# Patient Record
Sex: FEMALE | Race: WHITE | NOT HISPANIC OR LATINO | URBAN - METROPOLITAN AREA
[De-identification: names, ages, dates, MRNs, and addresses within clinical notes are randomized per-mention and may not be internally consistent; named-entity substitution may affect disease eponyms.]

---

## 2017-09-25 ENCOUNTER — OFFICE VISIT - RIVER FALLS (OUTPATIENT)
Dept: FAMILY MEDICINE | Facility: CLINIC | Age: 27
End: 2017-09-25

## 2017-09-25 ASSESSMENT — MIFFLIN-ST. JEOR: SCORE: 1832.74

## 2018-01-02 ENCOUNTER — OFFICE VISIT - RIVER FALLS (OUTPATIENT)
Dept: FAMILY MEDICINE | Facility: CLINIC | Age: 28
End: 2018-01-02

## 2018-09-04 ENCOUNTER — OFFICE VISIT - RIVER FALLS (OUTPATIENT)
Dept: FAMILY MEDICINE | Facility: CLINIC | Age: 28
End: 2018-09-04

## 2018-09-04 ASSESSMENT — MIFFLIN-ST. JEOR: SCORE: 1863.59

## 2018-09-06 ENCOUNTER — AMBULATORY - RIVER FALLS (OUTPATIENT)
Dept: FAMILY MEDICINE | Facility: CLINIC | Age: 28
End: 2018-09-06

## 2019-03-19 ENCOUNTER — OFFICE VISIT - RIVER FALLS (OUTPATIENT)
Dept: FAMILY MEDICINE | Facility: CLINIC | Age: 29
End: 2019-03-19

## 2019-03-26 ENCOUNTER — OFFICE VISIT - RIVER FALLS (OUTPATIENT)
Dept: FAMILY MEDICINE | Facility: CLINIC | Age: 29
End: 2019-03-26

## 2019-03-26 ASSESSMENT — MIFFLIN-ST. JEOR: SCORE: 1830.02

## 2019-06-19 ENCOUNTER — AMBULATORY - RIVER FALLS (OUTPATIENT)
Dept: FAMILY MEDICINE | Facility: CLINIC | Age: 29
End: 2019-06-19

## 2022-02-11 VITALS
WEIGHT: 250.6 LBS | BODY MASS INDEX: 42.78 KG/M2 | HEART RATE: 81 BPM | OXYGEN SATURATION: 98 % | SYSTOLIC BLOOD PRESSURE: 116 MMHG | HEIGHT: 64 IN | DIASTOLIC BLOOD PRESSURE: 70 MMHG

## 2022-02-11 VITALS
WEIGHT: 257.4 LBS | HEART RATE: 88 BPM | BODY MASS INDEX: 43.94 KG/M2 | SYSTOLIC BLOOD PRESSURE: 110 MMHG | DIASTOLIC BLOOD PRESSURE: 78 MMHG | HEIGHT: 64 IN | TEMPERATURE: 98.8 F

## 2022-02-11 VITALS
OXYGEN SATURATION: 99 % | WEIGHT: 250 LBS | DIASTOLIC BLOOD PRESSURE: 70 MMHG | SYSTOLIC BLOOD PRESSURE: 108 MMHG | TEMPERATURE: 97.8 F | HEIGHT: 64 IN | BODY MASS INDEX: 42.68 KG/M2 | HEART RATE: 61 BPM

## 2022-02-11 VITALS
OXYGEN SATURATION: 97 % | DIASTOLIC BLOOD PRESSURE: 78 MMHG | WEIGHT: 255.8 LBS | HEART RATE: 79 BPM | BODY MASS INDEX: 44.25 KG/M2 | SYSTOLIC BLOOD PRESSURE: 118 MMHG

## 2022-02-16 NOTE — PROGRESS NOTES
Patient:   DANICA DE JESUS            MRN: [id number]            FIN: [id number]               Age:   28 years     Sex:  Female     :  1990   Associated Diagnoses:   Counseling about travel   Author:   Quincy Wright MD      Chief Complaint   3/26/2019 4:11 PM CDT    Travel physical to [place]      History of Present Illness   see chief complaint as noted above and confirmed with the patient     28 year old female here today for a travel physical. She is going to be traveling to [place], her  was born in [place] and they are traveling there on a mission trip. She is going to be leaving in the beginning of 2019. She is going to be staying in [facility] and going to [employer]. Mom would like to know which vaccines she needs.       Review of Systems   Constitutional:  No fever.    Respiratory:  No shortness of breath.    Hematology/Lymphatics:  No bleeding tendency.    Immunologic:  No recurrent infections, No malaise.       Health Status   Allergies:    Allergic Reactions (Selected)  Severity Not Documented  Nickel (No reactions were documented)   Medications:  (Selected)      Problem list:    All Problems  Migraines / SNOMED CT [id number] / Confirmed  Morbid obesity / SNOMED CT 634062944 / Probable  Resolved: History of varicella as a child / SNOMED CT 557211176      Histories   Past Medical History:    Active  Migraines ([id number])  Resolved  History of varicella as a child (926768079):  Resolved.   Family History:    ALS - Amyotrophic lateral sclerosis  Grandmother (P) ()     Procedure history:    No active procedure history items have been selected or recorded.   Social History:        Alcohol Assessment: Current            1-2 times per month, 1 drinks/episode maximum.      Tobacco Assessment: Denies Tobacco Use            Never smoker      Substance Abuse Assessment: Denies Substance Abuse            Never       Employment and Education Assessment            .      Home and Environment Assessment            Marital status:  (Living together).  Spouse/Partner name: Ky.  Lives with Spouse.  Living               situation: Home/Independent.      Nutrition and Health Assessment            Type of diet: Regular.      Exercise and Physical Activity Assessment: Does not exercise      Sexual Assessment            Sexually active: Yes.  Sexual orientation: Heterosexual.      Other Assessment            First menses age 14.  Regular menses.  Menstrual duration 4-5 days.  Cycle interval 29 +/- days.  No history               of abnormal Pap smear.      Physical Examination   Vital Signs   3/26/2019 4:11 PM CDT Temperature Tympanic 97.8 DegF  LOW    Peripheral Pulse Rate 61 bpm    Pulse Site Radial artery    Systolic Blood Pressure 108 mmHg    Diastolic Blood Pressure 70 mmHg    Mean Arterial Pressure 83 mmHg    BP Site Right arm    Oxygen Saturation 99 %      Measurements from flowsheet : Measurements   3/26/2019 4:11 PM CDT Height Measured - Standard 63.75 in    Weight Measured - Standard 250 lb    BSA 2.26 m2    Body Mass Index 43.24 kg/m2  HI      General:  Alert and oriented, No acute distress.    Eye:  Pupils are equal, round and reactive to light, Normal conjunctiva.    HENT:  Oral mucosa is moist.    Neck:  Supple.    Respiratory:  Respirations are non-labored.    Cardiovascular:  Normal rate, Regular rhythm, No edema.    Gastrointestinal:  Non-distended.    Musculoskeletal:  Normal gait.    Integumentary:  Warm, No rash.    Psychiatric:  Cooperative, Appropriate mood & affect, Normal judgment.       Review / Management   Results review      Impression and Plan       Diagnosis     Counseling about travel (UAJ69-JU Z71.89).     Plan:  Will give Typhoid, First Hepatitis A, she will need to come in for second Hepatitis A in the beginning of June 2019. Will also give Flu vaccine.     Will send in  Cipro for travelers Diarrhea. .    I, Violetta Loco Medical Assistant acted solely as a scribe for, and in presence of Dr. Quincy Wright who performed the services.

## 2022-02-16 NOTE — PROGRESS NOTES
Patient:   DANICA DE JESUS            MRN: [id number]            FIN: [id number]               Age:   28 years     Sex:  Female     :  1990   Associated Diagnoses:   Pre-employment examination   Author:   Etta Lizarraga      Subjective   Chief complaint 2018 12:07 PM CDT    Pt here for pre-employment px/TB test -[facility]  .     see copy of h & P      Health Status   Allergies:    Allergic Reactions (Selected)  Severity Not Documented  Nickel (No reactions were documented)   Problem list:    All Problems  Migraines / SNOMED CT [id number] / Confirmed  Morbid obesity / SNOMED CT 923030708 / Probable  Resolved: History of varicella as a child / SNOMED CT 149799158      Objective   Vital Signs   2018 12:07 PM CDT Temperature Tympanic 98.8 DegF    Peripheral Pulse Rate 88 bpm    Pulse Site Radial artery    HR Method Manual    Systolic Blood Pressure 110 mmHg    Diastolic Blood Pressure 78 mmHg    Mean Arterial Pressure 89 mmHg    BP Site Right arm    BP Method Manual      Measurements from flowsheet : Measurements   2018 12:07 PM CDT Height Measured - Standard 63.75 in    Weight Measured - Standard 257.4 lb    BSA 2.29 m2    Body Mass Index 44.52 kg/m2  HI         Impression and Plan   Assessment and Plan:          Diagnosis: Pre-employment examination (EHV71-IU Z02.1).    Orders     no contraindications to employment pending [place] screen.     .

## 2022-02-16 NOTE — TELEPHONE ENCOUNTER
---------------------  From: Sherry Hess LPN (Phone Messages Pool ([id number]_[facility]))   To: Phone Messages Pool ([id number]_[place] [facility] [place]);     Sent: 3/19/2019 8:51:44 AM CDT  Subject: travel vaccine questions       Phone Message    PCP:   NEFTALY       Time of Call:  0821       Person Calling:  Patient   Phone number:  [phone] ok LM    Returned call at: [phone]    Note:   Patient and family has appt. at 4pm tonight to discuss travel vaccines.  She is hoping to find out what would be recommended for her daughter since she is so little when traveling to [place].  She has a list of recommended vaccines and wants to know if will be able to get everything they need tonight.  I called and left message stating that these are all good questions to discuss with the Doctor tonight at appt.  Explained that some vaccines are more than one dose and some may not be ones we have in house but orders can be given by  Dr. here at [facility] and filled elsewhere if needed.  It really depends on what is needed which is why we do the travel appt.      Last office visit and reason:  09/04/2018 pre employment PEPatient calls back - notified her that she would need to keep her travel appt to discuss vaccines and recommendations. She asks about cost, advised her that I didn't know the answer to that, would need to speak with business office regarding this.

## 2022-02-16 NOTE — PROGRESS NOTES
Patient:   DANICA DE JESUS            MRN: [id number]            FIN: [id number]               Age:   27 years     Sex:  Female     :  1990   Associated Diagnoses:   Paronychia of finger   Author:   Quincy Wright MD      Chief Complaint   2018 5:43 PM CST     Injury to right thumb nail. Possible infection, is not getting better.        Subjective   Chief complaint 2018 5:43 PM CST     Injury to right thumb nail. Possible infection, is not getting better.  .     no fever or chills      Health Status   Allergies:    Allergic Reactions (Selected)  Severity Not Documented  Nickel (No reactions were documented)   Medications:  (Selected)   Prescriptions  Prescribed  cephalexin 500 mg oral capsule: 1 cap(s) ( 500 mg ), PO, tid, # 21 cap(s), 0 Refill(s), Type: Maintenance, Pharmacy: [facility], 1 cap(s) po tid,x7 day(s)   Problem list:    All Problems (Selected)  Migraines / [id number] / Confirmed  Morbid obesity / 258271062 / Probable      Objective   Vital Signs   2018 5:43 PM CST Peripheral Pulse Rate 79 bpm    HR Method Electronic    Systolic Blood Pressure 118 mmHg    Diastolic Blood Pressure 78 mmHg    Mean Arterial Pressure 91 mmHg    BP Site Right arm    BP Method Manual    Oxygen Saturation 97 %      Measurements from flowsheet : Measurements   2018 5:43 PM CST     Weight Measured - Standard                255.8 lb     General:  Alert and oriented, No acute distress.    Integumentary:  swelling and redness beside nail.    Psychiatric:  Cooperative, Appropriate mood & affect.       Impression and Plan   Assessment and Plan:          Diagnosis: Paronychia of finger (NNN75-YF L03.019).         Course: no abscess seen  will start on antibiotics.    Orders      (Selected)   Prescriptions  Prescribed  cephalexin 500 mg oral capsule: 1 cap(s) ( 500 mg ), PO, tid, # 21 cap(s), 0 Refill(s), Type: Maintenance, Pharmacy: [facility], 1 cap(s)  po tid,x7 day(s).     .

## 2022-02-16 NOTE — PROGRESS NOTES
Patient:   DANICA DE JESUS            MRN: [id number]            FIN: [id number]               Age:   27 years     Sex:  Female     :  1990   Associated Diagnoses:   Pregnancy   Author:   Dee Dee Meza MD      Chief Complaint   2017 2:19 PM CDT    Patient here for pregnancy test. Patient reports two positive tests at home.      History of Present Illness   Patient here with pregnancy tests positive at home. LMP 17. OLMAN 18.  First pregnancy. Generally feeling well. Rare headaches. On PNV.      Health Status   Allergies:    Allergic Reactions (All)  No Known Medication Allergies  Canceled/Inactive Reactions (All)  No known allergies   Medications:  (Selected)   Documented Medications  Documented  Aleve: ( 220 mg ), po, prn, PRN: for cold symptoms, 0 Refill(s), Type: Maintenance   Problem list:    All Problems  Migraines / SNOMED CT [id number] / Confirmed  Morbid obesity / SNOMED CT 023036529 / Probable      Physical Examination   Vital Signs   2017 2:19 PM CDT Peripheral Pulse Rate 81 bpm    Systolic Blood Pressure 116 mmHg    Diastolic Blood Pressure 70 mmHg    Mean Arterial Pressure 85 mmHg    Oxygen Saturation 98 %      Measurements from flowsheet : Measurements   2017 2:19 PM CDT Height Measured - Standard 63.75 in    Weight Measured - Standard 250.6 lb    BSA 2.26 m2    Body Mass Index 43.35 kg/m2      General:  Alert and oriented, No acute distress.       Review / Management   Results review:  All Results   2017 2:31 PM CDT hCG Ql POC Positive    POC Test Comments POC Test Comments   .       Impression and Plan   Diagnosis     Pregnancy (RLC91-UJ Z33.1).     Plan:  20/20 minutes in counseling. Reviewed diet, activity, options for delivery, obgyn vs midwife, benefits of hospital delivery, routine obstetric care, first ob visits. She is still undecided on where to seek care. Will let me know if I can assist with referral. .

## 2022-02-16 NOTE — NURSING NOTE
Comprehensive Intake Entered On:  3/26/2019 4:13 PM CDT    Performed On:  3/26/2019 4:11 PM CDT by Violetta Loco MA               Summary   Chief Complaint :   Travel physical to [place]    Menstrual Status :   Menarcheal   Weight Measured :   250 lb(Converted to: 250 lb 0 oz, 113.40 kg)    Height Measured :   63.75 in(Converted to: 5 ft 4 in, 161.92 cm)    Body Mass Index :   43.24 kg/m2 (HI)    Body Surface Area :   2.26 m2   Systolic Blood Pressure :   108 mmHg   Diastolic Blood Pressure :   70 mmHg   Mean Arterial Pressure :   83 mmHg   Peripheral Pulse Rate :   61 bpm   BP Site :   Right arm   Pulse Site :   Radial artery   Temperature Tympanic :   97.8 DegF(Converted to: 36.6 DegC)  (LOW)    Oxygen Saturation :   99 %   Violetta Loco MA - 3/26/2019 4:11 PM CDT   Health Status   Allergies Verified? :   Yes   Medication History Verified? :   Yes   Immunizations Current :   Unknown   Medical History Verified? :   No   Pre-Visit Planning Status :   Completed   Tobacco Use? :   Never smoker   Violetta Loco MA - 3/26/2019 4:11 PM CDT   Consents   Consent for Immunization Exchange :   Consent Granted   Consent for Immunizations to Providers :   Consent Granted   Violetta Loco MA - 3/26/2019 4:11 PM CDT   Meds / Allergies   (As Of: 3/26/2019 4:13:42 PM CDT)   Allergies (Active)   Nickel  Estimated Onset Date:   Unspecified ; Created By:   Padmini Littlejohn; Reaction Status:   Active ; Category:   Drug ; Substance:   Nickel ; Type:   Allergy ; Updated By:   Padmini Littlejohn; Reviewed Date:   9/4/2018 12:14 PM CDT        Medication List   (As Of: 3/26/2019 4:13:42 PM CDT)   No Known Home Medications     Violetta Loco MA - 3/26/2019 4:11:59 PM